# Patient Record
Sex: FEMALE | Race: AMERICAN INDIAN OR ALASKA NATIVE
[De-identification: names, ages, dates, MRNs, and addresses within clinical notes are randomized per-mention and may not be internally consistent; named-entity substitution may affect disease eponyms.]

---

## 2019-10-02 ENCOUNTER — HOSPITAL ENCOUNTER (OUTPATIENT)
Dept: HOSPITAL 5 - TRG | Age: 39
Discharge: HOME | End: 2019-10-02
Attending: OBSTETRICS & GYNECOLOGY
Payer: COMMERCIAL

## 2019-10-02 VITALS — SYSTOLIC BLOOD PRESSURE: 139 MMHG | DIASTOLIC BLOOD PRESSURE: 84 MMHG

## 2019-10-02 DIAGNOSIS — Z3A.37: ICD-10-CM

## 2019-10-02 LAB
BILIRUB UR QL STRIP: (no result)
BLOOD UR QL VISUAL: (no result)
PH UR STRIP: 6 [PH] (ref 5–7)
PROT UR STRIP-MCNC: (no result) MG/DL
RBC #/AREA URNS HPF: 3 /HPF (ref 0–6)
UROBILINOGEN UR-MCNC: < 2 MG/DL (ref ?–2)
WBC #/AREA URNS HPF: 1 /HPF (ref 0–6)

## 2019-10-02 PROCEDURE — 81001 URINALYSIS AUTO W/SCOPE: CPT

## 2019-10-02 PROCEDURE — 59025 FETAL NON-STRESS TEST: CPT

## 2019-10-16 ENCOUNTER — HOSPITAL ENCOUNTER (INPATIENT)
Dept: HOSPITAL 5 - TRG | Age: 39
LOS: 3 days | Discharge: HOME | End: 2019-10-19
Attending: OBSTETRICS & GYNECOLOGY | Admitting: OBSTETRICS & GYNECOLOGY
Payer: COMMERCIAL

## 2019-10-16 DIAGNOSIS — Z3A.39: ICD-10-CM

## 2019-10-16 DIAGNOSIS — Z23: ICD-10-CM

## 2019-10-16 LAB
ALT SERPL-CCNC: 9 UNITS/L (ref 7–56)
HCT VFR BLD CALC: 28.3 % (ref 30.3–42.9)
HGB BLD-MCNC: 8.9 GM/DL (ref 10.1–14.3)
MCHC RBC AUTO-ENTMCNC: 31 % (ref 30–34)
MCV RBC AUTO: 68 FL (ref 79–97)
PLATELET # BLD: 297 K/MM3 (ref 140–440)
RBC # BLD AUTO: 4.15 M/MM3 (ref 3.65–5.03)
URATE SERPL-MCNC: 4.9 MG/DL (ref 3.5–7.6)

## 2019-10-16 PROCEDURE — 84450 TRANSFERASE (AST) (SGOT): CPT

## 2019-10-16 PROCEDURE — 86900 BLOOD TYPING SEROLOGIC ABO: CPT

## 2019-10-16 PROCEDURE — 84550 ASSAY OF BLOOD/URIC ACID: CPT

## 2019-10-16 PROCEDURE — 3E033VJ INTRODUCTION OF OTHER HORMONE INTO PERIPHERAL VEIN, PERCUTANEOUS APPROACH: ICD-10-PCS | Performed by: OBSTETRICS & GYNECOLOGY

## 2019-10-16 PROCEDURE — 86592 SYPHILIS TEST NON-TREP QUAL: CPT

## 2019-10-16 PROCEDURE — 86850 RBC ANTIBODY SCREEN: CPT

## 2019-10-16 PROCEDURE — 82565 ASSAY OF CREATININE: CPT

## 2019-10-16 PROCEDURE — 85027 COMPLETE CBC AUTOMATED: CPT

## 2019-10-16 PROCEDURE — 85018 HEMOGLOBIN: CPT

## 2019-10-16 PROCEDURE — 82962 GLUCOSE BLOOD TEST: CPT

## 2019-10-16 PROCEDURE — 85014 HEMATOCRIT: CPT

## 2019-10-16 PROCEDURE — 36415 COLL VENOUS BLD VENIPUNCTURE: CPT

## 2019-10-16 PROCEDURE — 83615 LACTATE (LD) (LDH) ENZYME: CPT

## 2019-10-16 PROCEDURE — 86901 BLOOD TYPING SEROLOGIC RH(D): CPT

## 2019-10-16 PROCEDURE — 84460 ALANINE AMINO (ALT) (SGPT): CPT

## 2019-10-16 PROCEDURE — 83735 ASSAY OF MAGNESIUM: CPT

## 2019-10-16 RX ADMIN — SODIUM CHLORIDE, SODIUM LACTATE, POTASSIUM CHLORIDE, AND CALCIUM CHLORIDE SCH MLS/HR: .6; .31; .03; .02 INJECTION, SOLUTION INTRAVENOUS at 14:20

## 2019-10-16 NOTE — HISTORY AND PHYSICAL REPORT
History of Present Illness


Date of examination: 10/16/19


Date of admission: 


10/16/19 08:20





Chief complaint: 


Here for induction


History of present illness: 


The patient is a 40 yo  at 39.3 weeks EGA who presents for IOL secondary 

to poorly controlled GDM. She reports positive FM and denies contractions, 

vaginal bleeding, or LOF. She has received prenatal care with [facility] since 13 weeks EGA. Her prenatal course has been complicated by advanced 

maternal age, grand multiparity, poorly controlled GDM, polyhydramnios, and poor

adherence to [facility] appts. She is GBS negative.





Past History


Past Medical History: no pertinent history


Past Surgical History: GYN/uterine surgery (ovarian cystectomy), other (hernia 

repair)


GYN History: abnormal PAP smear ([facility]US 3/18/19)


Family/Genetic History: none


Social history: no significant social history





- Obstetrical History


Expected Date of Delivery: 10/20/19


Actual Gestation: 39 Week(s) 3 Day(s) 


: 5


Para: 4


Hx # Term Pregnancies: 4


Number of Living Children: 4





Medications and Allergies


                                    Allergies











Allergy/AdvReac Type Severity Reaction Status Date / Time


 


No Known Allergies Allergy   Verified 10/16/19 09:12











                                Home Medications











 Medication  Instructions  Recorded  Confirmed  Last Taken  Type


 


Prenatal Vitamin 325 unit PO DAILY 10/16/19 10/16/19 10/15/19 09:00 History











Active Meds: 


Active Medications





Butorphanol Tartrate (Stadol)  2 mg IV Q2H PRN


   PRN Reason: Pain , Severe (7-10)


Ephedrine Sulfate (Ephedrine Sulfate)  10 mg IV Q2M PRN


   PRN Reason: Hypotension


Fentanyl (Sublimaze)  100 mcg IV Q2H PRN


   PRN Reason: Labor Pain


Oxytocin/Sodium Chloride (Pitocin/Ns 20 Unit/1000ml Drip)  20 units in 1,000 mls

@ 125 mls/hr IV AS DIRECT ASHUTOSH


Oxytocin/Sodium Chloride (Pitocin/Ns 30 Unit/500ml)  30 units in 500 mls @ 1 

mls/hr IV TITR ASHUTOSH; Protocol


Oxytocin/Sodium Chloride (Pitocin/Ns 30 Unit/500ml)  30 units in 500 mls @ 0 

mls/hr IV TITR ASHUTOSH; Protocol


Lactated Ringer's (Lactated Ringers)  1,000 mls @ 125 mls/hr IV AS DIRECT ASHUTOSH


Oxytocin/Sodium Chloride (Pitocin/Ns 30 Unit/500ml)  30 units in 500 mls @ 1 

mls/hr IV TITR ASHUTOSH; Protocol


Mineral Oil (Mineral Oil)  30 ml PO QHS PRN


   PRN Reason: Constipation


Nalbuphine HCl (Nubain)  10 mg IV Q2H PRN


   PRN Reason: Pain, Moderate (4-6)


Naloxone HCl (Narcan 0.4 Mg/1 Ml)  0.1 mg IV Q2MIN PRN


   PRN Reason: Res Rate </= 8 or 02 SAT < 92%


Promethazine HCl (Phenergan)  25 mg PO Q6H PRN


   PRN Reason: Nausea And Vomiting


Terbutaline Sulfate (Brethine)  0.25 mg SUB-Q ONCE PRN


   PRN Reason: Hyperstimulation/Hypertonicity


Terbutaline Sulfate (Brethine)  0.25 mg IVP ONCE PRN


   PRN Reason: Hyperstimulation/Hypertonicity











Review of Systems


All systems: negative


Eyes: no blurred vision, no other (scotomata)


Cardiovascular: no chest pain, no edema


Respiratory: no shortness of breath


Gastrointestinal: no abdominal pain, no nausea, no heartburn


Genitourinary: no vaginal bleeding, no vaginal discharge, no leakage of fluid, 

no dysuria, no contractions


Neurological: no headaches





- Vital Signs


Vital signs: 


                                   Vital Signs











Pulse BP


 


 97 H  137/95 


 


 10/16/19 08:47  10/16/19 08:47








                                        











Temp Pulse Resp BP Pulse Ox


 


    82      147/93   100 


 


    10/16/19 11:03     10/16/19 11:03  10/16/19 10:30














- Physical Exam


Lungs: Positive: Normal air movement


Abdomen: Positive: normal appearance, soft


Genitourinary (Female): Positive: normal external genitalia, normal perenium


Vagina: Positive: normal moisture


Uterus: Positive: enlarged (gravid), normal contour


Anus/Rectum: Positive: normal perianal skin


Extremities: Positive: normal





- Obstetrical


FHR: category 1


Uterine Contraction Monitor Mode: External


Cervical Dilatation: 2


Cervical Effacement Percentage: 40


Fetal station: -3


Uterine Contraction Pattern: Absent





Results


Result Diagrams: 


                                 10/16/19 10:52





                                 10/16/19 10:52


                              Abnormal lab results











  10/16/19 10/16/19 Range/Units





  10:52 10:52 


 


Hgb  8.9 L   (10.1-14.3)  gm/dl


 


Hct  28.3 L   (30.3-42.9)  %


 


MCV  68 L   (79-97)  fl


 


MCH  21 L   (28-32)  pg


 


RDW  17.2 H   (13.2-15.2)  %


 


Creatinine   0.5 L  (0.7-1.2)  mg/dL


 


Lactate Dehydrogenase   203 H  ()  units/L








All other labs normal.








Assessment and Plan


A:


40 yo  at 39.3 weeks EGA


Poorly controlled GDM


Gestational hypertension


Hx polyhydramnios this pregnancy


Advanced maternal age





P:


Admit for induction of labor


Cervical ripening with low-dose Pitocin


PIH labs


Accucheck q4hr


Pain relief as requested


Anticipate

## 2019-10-16 NOTE — EVENT NOTE
Date: 10/16/19


Cook Catheter placed in sterile fashion, both balloons inflated to 80cc. SVE now

2.5/50/-3. Patient tolerated well. Continue low dose Pitocin.

## 2019-10-17 LAB
HCT VFR BLD CALC: 24.1 % (ref 30.3–42.9)
HGB BLD-MCNC: 7.9 GM/DL (ref 10.1–14.3)

## 2019-10-17 PROCEDURE — 0HQ9XZZ REPAIR PERINEUM SKIN, EXTERNAL APPROACH: ICD-10-PCS | Performed by: OBSTETRICS & GYNECOLOGY

## 2019-10-17 RX ADMIN — IBUPROFEN SCH MG: 600 TABLET, FILM COATED ORAL at 23:51

## 2019-10-17 RX ADMIN — SODIUM CHLORIDE, SODIUM LACTATE, POTASSIUM CHLORIDE, AND CALCIUM CHLORIDE SCH MLS/HR: .6; .31; .03; .02 INJECTION, SOLUTION INTRAVENOUS at 05:25

## 2019-10-17 RX ADMIN — IBUPROFEN SCH MG: 600 TABLET, FILM COATED ORAL at 13:56

## 2019-10-17 RX ADMIN — IBUPROFEN SCH MG: 600 TABLET, FILM COATED ORAL at 18:23

## 2019-10-17 RX ADMIN — FERROUS SULFATE TAB 325 MG (65 MG ELEMENTAL FE) SCH MG: 325 (65 FE) TAB at 23:52

## 2019-10-17 NOTE — PROGRESS NOTE
Assessment and Plan


A:


40 yo  at 39.3 weeks EGA


Poorly controlled GDM- BG normal during hospital stay


Gestational hypertension


Hx polyhydramnios this pregnancy


Advanced maternal age





P:


AROM clear fluid at 0830, FHT category 1 throughout


Accucheck q4hr


Pain relief as requested


Anticipate 





Subjective





- Subjective


Date of service: 10/17/19


Principal diagnosis: IOL for GDM


Interval history: 


The patient is a 40 yo  at 39.4 weeks EGA who is on day 2 of IOL for 

poorly controlled GDM. She was also found to have elevated BP upon admission, 

and has been diagnosed with gestational hypertension. BP have been mild range to

normotensive overnight. She is s/p Cook catheter and low dose pit. Pit was 

increased overnight after she was found to be 3cm dilated.


Patient reports: contractions (q5 min), other (spotting), no loss of fluid





Objective





- Vital Signs


Vital Signs: 


                               Vital Signs - 12hr











  10/16/19 10/16/19 10/16/19





  21:23 22:24 23:24


 


Temperature   


 


Pulse Rate 90 78 90


 


Respiratory   





Rate   


 


Blood Pressure 146/70 135/73 144/88














  10/16/19 10/16/19 10/17/19





  23:25 23:30 00:24


 


Temperature 98.2 F  


 


Pulse Rate  96 H 84


 


Respiratory 20  





Rate   


 


Blood Pressure  129/76 152/89














  10/17/19 10/17/19 10/17/19





  00:25 02:25 03:30


 


Temperature   98.6 F


 


Pulse Rate 84 76 


 


Respiratory   18





Rate   


 


Blood Pressure 140/89 145/87 














  10/17/19 10/17/19





  03:32 07:57


 


Temperature  98.7 F


 


Pulse Rate 86 


 


Respiratory  





Rate  


 


Blood Pressure 137/86 














- Exam


Abdomen: Present: soft


Uterus: Present: normal


FHR: category 1


Uterine Contraction Monitor Mode: External


Cervical Dilatation: 5


Cervical Effacement Percentage: 50


Fetal station: -3


Uterine Contraction Frequency (min): 5


Uterine Contraction Duration: 60


Uterine Contraction Pattern: Irregular


Extremities: normal





- Labs


Labs: 


                                  Abnormal Labs











  10/16/19 10/16/19 10/16/19





  10:52 10:52 13:29


 


Hgb  8.9 L  


 


Hct  28.3 L  


 


MCV  68 L  


 


MCH  21 L  


 


RDW  17.2 H  


 


Creatinine   0.5 L 


 


POC Glucose    62 L


 


Lactate Dehydrogenase   203 H 








                         Laboratory Results - last 24 hr











  10/16/19 10/16/19 10/16/19





  10:52 10:52 10:52


 


WBC  7.8  


 


RBC  4.15  


 


Hgb  8.9 L  


 


Hct  28.3 L  


 


MCV  68 L  


 


MCH  21 L  


 


MCHC  31  


 


RDW  17.2 H  


 


Plt Count  297  


 


Creatinine   


 


Estimated GFR   


 


POC Glucose   


 


Uric Acid   


 


AST   


 


ALT   


 


Lactate Dehydrogenase   


 


RPR   Nonreactive 


 


Blood Type    AB POSITIVE


 


Antibody Screen    Negative














  10/16/19 10/16/19 10/16/19





  10:52 13:29 19:03


 


WBC   


 


RBC   


 


Hgb   


 


Hct   


 


MCV   


 


MCH   


 


MCHC   


 


RDW   


 


Plt Count   


 


Creatinine  0.5 L  


 


Estimated GFR  > 60  


 


POC Glucose   62 L  71


 


Uric Acid  4.9  


 


AST  18  


 


ALT  9  


 


Lactate Dehydrogenase  203 H  


 


RPR   


 


Blood Type   


 


Antibody Screen   














  10/17/19





  07:54


 


WBC 


 


RBC 


 


Hgb 


 


Hct 


 


MCV 


 


MCH 


 


MCHC 


 


RDW 


 


Plt Count 


 


Creatinine 


 


Estimated GFR 


 


POC Glucose  73


 


Uric Acid 


 


AST 


 


ALT 


 


Lactate Dehydrogenase 


 


RPR 


 


Blood Type 


 


Antibody Screen

## 2019-10-17 NOTE — PROCEDURE NOTE
OB Delivery Note





- Delivery


Date of Delivery: 10/17/19


Surgeon: SHANTA URIBE ([facility])


Estimated blood loss: 100cc





- Vaginal


Delivery presentation: vertex


Delivery position: OA


Intrapartum events: gestational hypertension


Delivery induction: oxytocin (and cook catheter)


Delivery augmentation: rupture of membranes, pitocin


Delivery monitor: external FHT


Route of delivery: 


Delivery placenta: spontaneous


Delivery cord: 3 umbilical vessels


Episiotomy: none


Delivery laceration: 1st degree


Delivery repair: other (none)


Anesthesia: none


Delivery comments: 


Excellent maternal effort resulted in ferguson delivery of viable female infant at 

1230. Provider en route when baby was delivered. Vigorous infant to maternal 

abdomen, delayed cord clamping x7 minutes. 8lb 9oz, Apgars 8/9. Placenta 

delivered spontaneously and intact at 1237, 3VC. Shallow 1st degree laceration 

noted, hemostatic, not repaired. Pt desires both breast and bottle feeding.





- Infant


  ** A


Apgar at 1 minute: 8


Apgar at 5 minutes: 9


Infant Gender: Female

## 2019-10-18 PROCEDURE — 3E0234Z INTRODUCTION OF SERUM, TOXOID AND VACCINE INTO MUSCLE, PERCUTANEOUS APPROACH: ICD-10-PCS | Performed by: OBSTETRICS & GYNECOLOGY

## 2019-10-18 RX ADMIN — IBUPROFEN SCH MG: 600 TABLET, FILM COATED ORAL at 23:34

## 2019-10-18 RX ADMIN — IBUPROFEN SCH MG: 600 TABLET, FILM COATED ORAL at 17:40

## 2019-10-18 RX ADMIN — IBUPROFEN SCH MG: 600 TABLET, FILM COATED ORAL at 06:04

## 2019-10-18 RX ADMIN — FERROUS SULFATE TAB 325 MG (65 MG ELEMENTAL FE) SCH MG: 325 (65 FE) TAB at 23:34

## 2019-10-18 RX ADMIN — IBUPROFEN SCH MG: 600 TABLET, FILM COATED ORAL at 12:10

## 2019-10-18 RX ADMIN — FERROUS SULFATE TAB 325 MG (65 MG ELEMENTAL FE) SCH MG: 325 (65 FE) TAB at 10:20

## 2019-10-18 NOTE — PROGRESS NOTE
Assessment and Plan





A/P 


PPD 1 


s/p mag for PIH for 24hrs 


transfer to   at 2p


consdier labetolol for BP 


close monitor of maternal status 





Subjective





- Subjective


Date of service: 10/18/19


Principal diagnosis: s/p /DM/PIH s/p mag


Patient reports: appetite normal, voiding normally, pain well controlled, 

flatus, ambulating normally


[place]: doing well





Objective





- Vital Signs


Latest vital signs: 


                                   Vital Signs











  Temp Pulse Resp BP BP Pulse Ox


 


 10/18/19 08:08   74   140/81  


 


 10/18/19 07:38   75   138/92  


 


 10/18/19 07:26   86     99


 


 10/18/19 07:21   84     98


 


 10/18/19 07:16   84     98


 


 10/18/19 07:11   84     98


 


 10/18/19 07:08   83   130/61  


 


 10/18/19 07:06   84     99


 


 10/18/19 07:01   85     99


 


 10/18/19 06:56   83     100


 


 10/18/19 06:51   80     100


 


 10/18/19 06:46   79     99


 


 10/18/19 06:41   64     59 L


 


 10/18/19 06:38   75   123/73  123/73  100


 


 10/18/19 06:36   78     100


 


 10/18/19 06:35   75     0 L


 


 10/18/19 06:08   77   142/88  142/88 


 


 10/18/19 05:09   82   157/83  157/83 


 


 10/18/19 04:38  98.3 F  84  20  139/78  139/78 


 


 10/18/19 03:38   77   138/80  


 


 10/18/19 03:08   87   135/84  


 


 10/18/19 02:38   81   145/83  


 


 10/18/19 02:08   74   141/80  


 


 10/18/19 01:38   80   136/85  


 


 10/18/19 01:08   76   135/79  135/79 


 


 10/18/19 00:38   86   124/79  


 


 10/18/19 00:08  98.7 F  81  20  139/82  139/82 


 


 10/17/19 23:51    20   


 


 10/17/19 23:39   83   134/91  


 


 10/17/19 23:08   82   127/70  


 


 10/17/19 22:38   86   129/71  


 


 10/17/19 22:08   91 H   123/66  


 


 10/17/19 21:38   97 H   131/73  


 


 10/17/19 21:08   99 H   131/74  


 


 10/17/19 20:38   103 H   123/72  


 


 10/17/19 20:17       63 L


 


 10/17/19 20:12   117 H     69 L


 


 10/17/19 20:08  98.5 F  100 H  19  124/70  124/70  99


 


 10/17/19 20:05   107 H     95


 


 10/17/19 20:03   107 H     92


 


 10/1719 20:00   101 H     98


 


 10/17/19 19:55   107 H     99


 


 10/17/19 19:51   109 H     94


 


 10/17/19 19:50   105 H     99


 


 10/17/19 19:45   111 H     100


 


 10/17/19 19:40   109 H     100


 


 10/17/19 19:38   112 H   120/71  


 


 10/17/19 19:35   118 H     100


 


 10/17/19 19:30   122 H     100


 


 10/17/19 19:19   116 H     99


 


 10/17/19 19:11   112 H     99


 


 10/17/19 19:08   111 H   128/69  


 


 10/1719 19:06   122 H     100


 


 10/17/19 19:01   121 H     71 L


 


 10/17/19 18:56   111 H     100


 


 10/17/19 18:51   112 H     100


 


 10/17/19 18:38   113 H   132/74   99


 


 10/17/19 18:33   110 H     99


 


 10/17/19 18:28   114 H     99


 


 10/17/19 18:23   112 H     99


 


 10/17/19 18:18   114 H     98


 


 10/17/19 18:08   117 H   141/74  


 


 10/17/19 17:59   124 H     99


 


 10/17/19 17:54   116 H     98


 


 10/17/19 17:49   99 H     100


 


 10/17/19 17:44   92 H     98


 


 10/17/19 17:39   103 H   176/79   98


 


 10/17/19 17:37   106 H   176/79  


 


 10/17/19 17:34   94 H     99


 


 10/17/19 17:33   96 H   181/79  


 


 10/17/19 17:29   105 H     99


 


 10/17/19 17:28   99 H   165/72  


 


 10/17/19 17:24   99 H     99


 


 10/17/19 17:22   102 H   187/86  


 


 10/17/19 17:19   110 H     99


 


 10/17/19 17:17   96 H   182/86  


 


 10/17/19 17:14   102 H     99


 


 10/17/19 17:12   100 H   177/86  


 


 10/17/19 17:09   101 H     99


 


 10/17/19 17:07   99 H   181/84  


 


 10/17/19 17:04   102 H     100


 


 10/17/19 17:02   101 H   177/81  


 


 10/17/19 16:59   107 H     100


 


 10/17/19 16:57   103 H   179/84  


 


 10/17/19 16:54   110 H     100


 


 10/17/19 16:52   107 H   170/82  


 


 10/17/19 16:49   101 H     100


 


 10/17/19 16:47   100 H   166/81  


 


 10/17/19 16:44   101 H     100


 


 10/17/19 16:42   101 H   157/75  


 


 10/17/19 16:39   115 H     100


 


 10/17/19 16:37   110 H   146/69  


 


 10/17/19 16:34   108 H     100


 


 10/17/19 16:32   103 H   149/71  


 


 10/17/19 16:29   111 H     100


 


 10/17/19 16:27   106 H   152/72  


 


 10/17/19 16:24   105 H     100


 


 10/17/19 16:22   110 H   148/68  


 


 10/17/19 16:20   108 H   158/76  


 


 10/17/19 16:19   107 H  18   158/76  100


 


 10/17/19 16:17   102 H   159/75   92


 


 10/17/19 16:14   103 H     100


 


 10/17/19 16:12   105 H   159/74  


 


 10/17/19 16:11   108 H     91


 


 10/17/19 16:09   121 H     98


 


 10/17/19 16:07   115 H   155/72  


 


 10/17/19 16:06   112 H     87


 


 10/17/19 16:04   111 H     99


 


 10/17/19 16:02   108 H   163/80  


 


 10/17/19 15:59   110 H     99


 


 10/17/19 15:57   105 H   160/83  


 


 10/17/19 15:54   113 H     99


 


 10/17/19 15:52   106 H   153/78  


 


 10/17/19 15:49   102 H     99


 


 10/17/19 15:47   101 H   157/76  


 


 10/17/19 15:44   105 H     100


 


 10/17/19 15:42   99 H   153/75  


 


 10/17/19 15:39   113 H     100


 


 10/17/19 15:37   100 H   160/79  


 


 10/17/19 15:34   95 H     100


 


 10/17/19 15:32   95 H   154/81  


 


 10/17/19 15:29   93 H     100


 


 10/17/19 15:27   94 H   162/86  


 


 10/17/19 15:24   97 H     100


 


 10/17/19 15:22   100 H   162/86  


 


 10/17/19 15:19   91 H     100


 


 10/17/19 15:17   90   161/86  


 


 10/17/19 15:14   85     100


 


 10/17/19 15:12   94 H   154/82  


 


 10/17/19 15:09   88     100


 


 10/17/19 15:07   86   154/82  


 


 10/17/19 15:04   90     100


 


 10/17/19 15:02   86   162/85  


 


 10/17/19 14:59   92 H     100


 


 10/17/19 14:57   84   159/86  


 


 10/17/19 14:54   86     100


 


 10/17/19 14:52   83   158/84  


 


 10/17/19 14:49   87     99


 


 10/17/19 14:47   86   154/83  


 


 10/17/19 14:44   84     99


 


 10/17/19 14:42   86   152/80  


 


 10/17/19 14:39   83     100


 


 10/17/19 14:37   80   150/74  


 


 10/17/19 14:34   98 H     99


 


 10/17/19 14:32   91 H   156/80  


 


 10/17/19 14:29   82     99


 


 10/17/19 14:27   80   156/81  


 


 10/17/19 14:24   90     100


 


 10/17/19 14:22   87   153/82  


 


 10/17/19 14:19   78     99


 


 10/17/19 14:17   81   153/80  


 


 10/17/19 14:14   81     99


 


 10/17/19 14:12   80   148/73  


 


 10/17/19 14:09   80     99


 


 10/17/19 14:07   86   151/69  


 


 10/17/19 14:04   93 H     99


 


 10/17/19 14:02   90   133/75  


 


 10/17/19 13:59   101 H     98


 


 10/17/19 13:54   91 H     99


 


 10/17/19 13:52   89   139/70  


 


 10/17/19 13:49   94 H     99


 


 10/17/19 13:47   92 H   149/75  


 


 10/17/19 13:44   98 H     98


 


 10/17/19 13:43   106 H   145/76  


 


 10/17/19 13:39   91 H     98


 


 10/17/19 13:37   81   158/74  


 


 10/17/19 13:34   80     99


 


 10/17/19 13:32   96 H   157/84  


 


 10/17/19 13:29   81   145/75  


 


 10/17/19 13:27   137 H   145/77  


 


 10/17/19 13:23   81   143/81  


 


 10/17/19 13:15   88   144/80  


 


 10/17/19 13:14   93 H   154/82  


 


 10/17/19 12:56   71   147/86  


 


 10/17/19 12:53   81   139/81  


 


 10/17/19 12:50   75   145/83  


 


 10/17/19 12:38   73   153/89  


 


 10/17/19 12:33   80   167/78  


 


 10/17/19 12:32   83   198/88  


 


 10/17/19 12:20   77   152/82  


 


 10/17/19 12:08   87   155/101  


 


 10/17/19 11:53   97 H   152/87  


 


 10/17/19 11:42   88   159/81  


 


 10/17/19 11:38   102 H   161/87  


 


 10/17/19 11:24   106 H   157/82  


 


 10/17/19 11:18   99 H   165/90  


 


 10/17/19 11:09   100 H   170/100  


 


 10/17/19 10:42   81   152/84  


 


 10/17/19 10:40   96 H   195/93  


 


 10/17/19 10:38   88   154/82  


 


 10/17/19 10:25   83   168/83  


 


 10/17/19 09:55   82   145/70  








                                Intake and Output











 10/17/19 10/18/19 10/18/19





 23:59 07:59 15:59


 


Intake Total 240 240 


 


Output Total 400 1550 


 


Balance -160 -1310 


 


Intake:   


 


  Oral 240 240 


 


Output:   


 


  Urine 400 1550 


 


    Indwelling Catheter 400 1550 


 


Other:   


 


  Total, Intake Amount 240 120 


 


  Total, Output Amount 200 300 














- Exam


Breasts: Present: normal


Cardiovascular: Present: Regular rate, Normal S1


Lungs: Present: Clear to auscultation, Normal air movement


Abdomen: Present: normal appearance, soft, normal bowel sounds.  Absent: 

distention, tenderness, guarding


Vulva: both: normal


Uterus: Present: normal, firm, fundal height below umbilicus.  Absent: 

bogginess, tenderness


Extremities: Present: normal


Incision: Present: normal, dry





- Labs


Labs: 


                              Abnormal lab results











  10/17/19 10/17/19 10/17/19 Range/Units





  13:55 23:01 23:01 


 


Hgb   7.9 L   (10.1-14.3)  gm/dl


 


Hct   24.1 L   (30.3-42.9)  %


 


Magnesium  3.40 H   4.70 H  (1.7-2.3)  mg/dL














  10/18/19 Range/Units





  05:37 


 


Hgb   (10.1-14.3)  gm/dl


 


Hct   (30.3-42.9)  %


 


Magnesium  4.90 H  (1.7-2.3)  mg/dL

## 2019-10-19 VITALS — DIASTOLIC BLOOD PRESSURE: 85 MMHG | SYSTOLIC BLOOD PRESSURE: 139 MMHG

## 2019-10-19 RX ADMIN — IBUPROFEN SCH MG: 600 TABLET, FILM COATED ORAL at 12:00

## 2019-10-19 RX ADMIN — IBUPROFEN SCH MG: 600 TABLET, FILM COATED ORAL at 05:31

## 2019-10-19 RX ADMIN — FERROUS SULFATE TAB 325 MG (65 MG ELEMENTAL FE) SCH MG: 325 (65 FE) TAB at 10:55

## 2019-10-19 NOTE — DISCHARGE SUMMARY
Providers





- Providers


Date of Admission: 


10/16/19 08:20





Date of discharge: 10/19/19


Attending physician: 


JEFFERY MAYES MD





Primary care physician: 


JEFFERY MAYES MD








Hospitalization


Reason for admission: induction of labor


Delivery: 


Episiotomy: none


Laceration: none


Incision: normal, dry, intact


Other postpartum procedures: none


Postpartum complications: other (preeclampsia )


Discharge diagnosis: IUP at term delivered


 baby: female


Hospital course: 





Rohini had IOL for DM and delivered  female. Notable increase in BP mag 

intiated for 24hrs. Did well PP -140/80s. No blood pressure meds 

indicated. Follow up on Monday for BP check 


Condition at discharge: Good


Disposition: DC- TO HOME OR SELFCARE





Plan





- Discharge Medications


Prescriptions: 


Ferrous Sulfate [Feosol 325 MG tab] 325 mg PO BID #60 tablet


Ferrous Sulfate [Ferrous Sulfate 324 MG] 324 mg PO BID #60 tablet.dr


Ibuprofen [Motrin] 600 mg PO Q6H PRN #60 tablet


 PRN Reason: Pain


Ibuprofen [Motrin] 600 mg PO Q8H PRN #30 tablet


 PRN Reason: Pain





- Provider Discharge Summary


Activity: routine, no sex for 6 weeks, no strenuous exercise


Diet: routine


Instructions: routine


Additional instructions: 


[]  Smoking cessation referral if applicable(refer to patient education folder 

for contact #)


[]  Refer to [facility] Booklet








Call your doctor immediately for:


* Fever > 100.5


* Heavy vaginal bleeding ( >1 pad per hour)


* Severe persistent headache


* Shortness of breath


* Reddened, hot, painful area to leg or breast


* Drainage or odor from incision.





* Keep incision clean and dry at all times and follow doctor's instructions 

regarding bathing/showering











- Follow up plan


Follow up: 


JEFFERY MAYES MD [Primary Care Provider] - 10/21/19

## 2019-10-19 NOTE — PROGRESS NOTE
Assessment and Plan





A/P 


PPD 2 


s/p mag for PIH for 24hrs 


-140/80s 


discharge home with BP check in 1 week 





Subjective





- Subjective


Date of service: 10/19/19


Principal diagnosis: s/p /DM/PIH s/p mag


Patient reports: appetite normal, voiding normally, pain well controlled, 

flatus, ambulating normally


: doing well





Objective





- Vital Signs


Latest vital signs: 


                                   Vital Signs











  Temp Pulse Resp BP BP Pulse Ox


 


 10/19/19 12:05  98.3 F  72  20  139/85   100


 


 10/19/19 08:19  98.4 F  78  20  141/88   99


 


 10/19/19 00:37  98.5 F  84  20  129/80   97


 


 10/18/19 16:51  98.5 F  88  14   132/83 


 


 10/18/19 14:04  97.9 F  85  18   133/82  100


 


 10/18/19 13:40   86  18  120/69  120/69 


 


 10/18/19 13:09   82   176/79  








                                Intake and Output











 10/18/19 10/19/19 10/19/19





 23:59 07:59 15:59


 


Intake Total 720 360 240


 


Balance 720 360 240


 


Intake:   


 


  Oral 720 360 240


 


Other:   


 


  Total, Intake Amount 240 120 240


 


  # Voids   


 


    Void 1 1 1


 


  # Bowel Movements   1














- Exam


Breasts: Present: normal


Cardiovascular: Present: Regular rate, Normal S1


Lungs: Present: Clear to auscultation, Normal air movement


Abdomen: Present: normal appearance, soft, normal bowel sounds.  Absent: 

distention, tenderness, guarding


Vulva: both: normal


Uterus: Present: normal, firm, fundal height below umbilicus.  Absent: 

bogginess, tenderness


Extremities: Absent: normal


Deep Tendon Reflex Grade: Normal +2





- Labs


Labs: 


                              Abnormal lab results











  10/18/19 Range/Units





  12:14 


 


Magnesium  4.00 H  (1.7-2.3)  mg/dL